# Patient Record
(demographics unavailable — no encounter records)

---

## 2025-04-18 NOTE — HISTORY OF PRESENT ILLNESS
[FreeTextEntry1] : new pt, establish care [de-identified] : Patient is a 75-year-old male with past medical history of HTN, HLD, CAD, PAD, CVA (many years ago), presenting to clinic to establish care. Patient is accompanied by daughter who is translating.  Recently hospitalized for UTI/ COVID, had a fall however his CT head was negative for bleed. Recently immigrated from [place] (September 2024), and had care delivered in [place]. Recently has been forgetful, and increasingly incontinent with his urine. Also unstable gait, though notably he has favored one leg since his CVA. Otherwise does not endorse chest pain, SOB, n/v, weight loss. Endorses cold intolerance.

## 2025-04-18 NOTE — PHYSICAL EXAM
[No Acute Distress] : no acute distress [Well Nourished] : well nourished [Normal Sclera/Conjunctiva] : normal sclera/conjunctiva [PERRL] : pupils equal round and reactive to light [Normal Outer Ear/Nose] : the outer ears and nose were normal in appearance [Normal Oropharynx] : the oropharynx was normal [No JVD] : no jugular venous distention [No Lymphadenopathy] : no lymphadenopathy [Supple] : supple [No Respiratory Distress] : no respiratory distress  [No Accessory Muscle Use] : no accessory muscle use [Clear to Auscultation] : lungs were clear to auscultation bilaterally [No Carotid Bruits] : no carotid bruits [No Abdominal Bruit] : a ~M bruit was not heard ~T in the abdomen [Soft] : abdomen soft [Non Tender] : non-tender [No HSM] : no HSM [Normal Bowel Sounds] : normal bowel sounds [No CVA Tenderness] : no CVA  tenderness [No Joint Swelling] : no joint swelling [No Rash] : no rash [de-identified] : Distant heart sounds  [de-identified] : No palpable pulses, 2+ pitting edema to mid-shins [de-identified] : Broad based gait

## 2025-04-18 NOTE — REVIEW OF SYSTEMS
[Constipation] : constipation [Incontinence] : incontinence [Frequency] : frequency [Fever] : no fever [Night Sweats] : no night sweats [Recent Change In Weight] : ~T no recent weight change [Discharge] : no discharge [Vision Problems] : no vision problems [Earache] : no earache [Nasal Discharge] : no nasal discharge [Chest Pain] : no chest pain [Palpitations] : no palpitations [Orthopena] : no orthopnea [Shortness Of Breath] : no shortness of breath [Wheezing] : no wheezing [Abdominal Pain] : no abdominal pain [Nausea] : no nausea [Vomiting] : no vomiting [Dysuria] : no dysuria [Joint Pain] : no joint pain [Back Pain] : no back pain [Headache] : no headache [Memory Loss] : no memory loss [FreeTextEntry1] : Cold intolerance

## 2025-04-18 NOTE — ASSESSMENT
[FreeTextEntry1] : Discussed with Dr. Flores.   Total time of encounter: 90 minutes   RTC in 5 weeks.   [employer] [facility]  [Vaccines Reviewed] : Immunizations reviewed today. Please see immunization details in the vaccine log within the immunization flowsheet.

## 2025-04-18 NOTE — HISTORY OF PRESENT ILLNESS
[FreeTextEntry1] : new pt, establish care [de-identified] : Patient is a 75-year-old male with past medical history of HTN, HLD, CAD, PAD, CVA (many years ago), presenting to clinic to establish care. Patient is accompanied by daughter who is translating.  Recently hospitalized for UTI/ COVID, had a fall however his CT head was negative for bleed. Recently immigrated from [place] (September 2024), and had care delivered in [place]. Recently has been forgetful, and increasingly incontinent with his urine. Also unstable gait, though notably he has favored one leg since his CVA. Otherwise does not endorse chest pain, SOB, n/v, weight loss. Endorses cold intolerance.

## 2025-04-18 NOTE — PHYSICAL EXAM
[No Acute Distress] : no acute distress [Well Nourished] : well nourished [Normal Sclera/Conjunctiva] : normal sclera/conjunctiva [PERRL] : pupils equal round and reactive to light [Normal Outer Ear/Nose] : the outer ears and nose were normal in appearance [Normal Oropharynx] : the oropharynx was normal [No JVD] : no jugular venous distention [No Lymphadenopathy] : no lymphadenopathy [Supple] : supple [No Respiratory Distress] : no respiratory distress  [No Accessory Muscle Use] : no accessory muscle use [Clear to Auscultation] : lungs were clear to auscultation bilaterally [No Carotid Bruits] : no carotid bruits [No Abdominal Bruit] : a ~M bruit was not heard ~T in the abdomen [Soft] : abdomen soft [Non Tender] : non-tender [No HSM] : no HSM [Normal Bowel Sounds] : normal bowel sounds [No CVA Tenderness] : no CVA  tenderness [No Joint Swelling] : no joint swelling [No Rash] : no rash [de-identified] : Distant heart sounds  [de-identified] : No palpable pulses, 2+ pitting edema to mid-shins [de-identified] : Broad based gait

## 2025-04-18 NOTE — END OF VISIT
[] : Resident [FreeTextEntry3] : New pt, here with daughter. Denies PND or orthopnea, does endorse LE swelling, worse at end of day, better in am. Gait is bad, hs not had PT. Recent hospitalization, records reviewed. Pt with calcifications in 3 arterial beds (carotid, LE, and on MV and AV on echo, so likely has severe longstanding atherosclerosis, and would benefit from high intensity statin, unclear if pt requires DAPT. On exam, frail appearing, NAD, difficulty with getting up from seated position, very distant heart sounds, lungs clear, bilat +2 pitting LE edema, absent distal pulses. Agree that some s/s concerning for NPH and would evaluated further with MRI, then can clarify other issues on return visits.

## 2025-05-20 NOTE — HISTORY OF PRESENT ILLNESS
[de-identified] : Patient is a 75-year-old male with past medical history of HTN, HLD, CAD, ?PAD, presenting to clinic to follow up. Patient is accompanied by daughter who was translating.  This visit patient is endorsing feeling well, still endorses cold intolerance, and unsteadiness on his feet (ambulates with a cane) as well as urinary incontinence. Denies feeling short of breath, chest pain, or claudication. Daughter presented echo report, which showed EF of 65%, grade 1 diastolic dysfunction, and moderate aortic and mitral regurgitation. Patient reports feeling well, no claudication, shortness of breath, chest pain, palpitations or wheezing.

## 2025-05-20 NOTE — HISTORY OF PRESENT ILLNESS
[de-identified] : Patient is a 75-year-old male with past medical history of HTN, HLD, CAD, ?PAD, presenting to clinic to follow up. Patient is accompanied by daughter who was translating.  This visit patient is endorsing feeling well, still endorses cold intolerance, and unsteadiness on his feet (ambulates with a cane) as well as urinary incontinence. Denies feeling short of breath, chest pain, or claudication. Daughter presented echo report, which showed EF of 65%, grade 1 diastolic dysfunction, and moderate aortic and mitral regurgitation. Patient reports feeling well, no claudication, shortness of breath, chest pain, palpitations or wheezing.

## 2025-05-20 NOTE — PHYSICAL EXAM
[No Acute Distress] : no acute distress [Well Developed] : well developed [Normal Sclera/Conjunctiva] : normal sclera/conjunctiva [EOMI] : extraocular movements intact [No JVD] : no jugular venous distention [No Lymphadenopathy] : no lymphadenopathy [Supple] : supple [Normal Rate] : normal rate  [Regular Rhythm] : with a regular rhythm [Normal S1, S2] : normal S1 and S2 [Soft] : abdomen soft [Non Tender] : non-tender [No HSM] : no HSM [Normal Bowel Sounds] : normal bowel sounds [de-identified] : 1+ pitting edema of both ankles

## 2025-05-20 NOTE — HISTORY OF PRESENT ILLNESS
[de-identified] : Patient is a 75-year-old male with past medical history of HTN, HLD, CAD, ?PAD, presenting to clinic to follow up. Patient is accompanied by daughter who was translating.  This visit patient is endorsing feeling well, still endorses cold intolerance, and unsteadiness on his feet (ambulates with a cane) as well as urinary incontinence. Denies feeling short of breath, chest pain, or claudication. Daughter presented echo report, which showed EF of 65%, grade 1 diastolic dysfunction, and moderate aortic and mitral regurgitation. Patient reports feeling well, no claudication, shortness of breath, chest pain, palpitations or wheezing.

## 2025-05-20 NOTE — ASSESSMENT
[FreeTextEntry1] :  Discussed with Dr. Franco.   Total time of encounter:90 minutes    RTC in 5 weeks, with nursing visit in 2 weeks for BMP lab draw.    [employer] [facility]

## 2025-05-20 NOTE — PHYSICAL EXAM
[No Acute Distress] : no acute distress [Well Developed] : well developed [Normal Sclera/Conjunctiva] : normal sclera/conjunctiva [EOMI] : extraocular movements intact [No JVD] : no jugular venous distention [No Lymphadenopathy] : no lymphadenopathy [Supple] : supple [Normal Rate] : normal rate  [Regular Rhythm] : with a regular rhythm [Normal S1, S2] : normal S1 and S2 [Soft] : abdomen soft [Non Tender] : non-tender [No HSM] : no HSM [Normal Bowel Sounds] : normal bowel sounds [de-identified] : 1+ pitting edema of both ankles

## 2025-05-25 NOTE — DISCUSSION/SUMMARY
[Hypertension] : hypertension [Low Sodium Diet] : low sodium diet [FreeTextEntry1] : Currently stable from a cardiovascular standpoint. Hypertensive today. Appears slightly volume overloaded. Patient with unclear history of CAD. Recent echo reviewed. Patient with moderate mitral regurgitation and moderate aortic regurgitation with preserved LV systolic function (LVEF 67%). Of note, he has CKD Stage III (creatinine 1.56, eGFR 45). Occasional SOB likely related to volume status. Dizziness and imbalance possibly secondary to normal pressure hydrocephalus. Continue current medications including furosemide 40 mg daily. ECG completed today and reviewed (findings as noted above). Daily weights. Daughter will attempt to obtain some prior medical records from [facility] for review. Follow up in 4-6 weeks. Patient to follow up with neurology. [EKG obtained to assist in diagnosis and management of assessed problem(s)] : EKG obtained to assist in diagnosis and management of assessed problem(s)

## 2025-05-25 NOTE — PHYSICAL EXAM
[Well Developed] : well developed [Well Nourished] : well nourished [No Acute Distress] : no acute distress [Normal Conjunctiva] : normal conjunctiva [Normal Venous Pressure] : normal venous pressure [No Carotid Bruit] : no carotid bruit [Normal S1, S2] : normal S1, S2 [No Murmur] : no murmur [No Rub] : no rub [No Gallop] : no gallop [Clear Lung Fields] : clear lung fields [Good Air Entry] : good air entry [No Respiratory Distress] : no respiratory distress  [Soft] : abdomen soft [Non Tender] : non-tender [Normal Gait] : normal gait [No Cyanosis] : no cyanosis [No Rash] : no rash [No Skin Lesions] : no skin lesions [Moves all extremities] : moves all extremities [No Focal Deficits] : no focal deficits [Normal Speech] : normal speech [Alert and Oriented] : alert and oriented [de-identified] : trace to 1+ piitng left ankle edema

## 2025-05-25 NOTE — CARDIOLOGY SUMMARY
[de-identified] : 05/22/25 - normal sinus rhythm, RBBB, LAFB, nonspecific ST abnormality [de-identified] : 05/09/25 - mod MR, AV gradient (peak 18 mmHg, mean 10 mmHg), mod AR, normal LA, concentric LV remodeling, normal RV size and function, LVEF 65%

## 2025-05-25 NOTE — CARDIOLOGY SUMMARY
[de-identified] : 05/22/25 - normal sinus rhythm, RBBB, LAFB, nonspecific ST abnormality [de-identified] : 05/09/25 - mod MR, AV gradient (peak 18 mmHg, mean 10 mmHg), mod AR, normal LA, concentric LV remodeling, normal RV size and function, LVEF 65%

## 2025-05-25 NOTE — PHYSICAL EXAM
[Well Developed] : well developed [Well Nourished] : well nourished [No Acute Distress] : no acute distress [Normal Conjunctiva] : normal conjunctiva [Normal Venous Pressure] : normal venous pressure [No Carotid Bruit] : no carotid bruit [Normal S1, S2] : normal S1, S2 [No Murmur] : no murmur [No Rub] : no rub [No Gallop] : no gallop [Clear Lung Fields] : clear lung fields [Good Air Entry] : good air entry [No Respiratory Distress] : no respiratory distress  [Soft] : abdomen soft [Non Tender] : non-tender [Normal Gait] : normal gait [No Cyanosis] : no cyanosis [No Rash] : no rash [No Skin Lesions] : no skin lesions [Moves all extremities] : moves all extremities [No Focal Deficits] : no focal deficits [Normal Speech] : normal speech [Alert and Oriented] : alert and oriented [de-identified] : trace to 1+ piitng left ankle edema

## 2025-05-25 NOTE — HISTORY OF PRESENT ILLNESS
[FreeTextEntry1] : Has some dizziness and balance issues. Occasional SOB. Denies chest pain or palpitations. Patient moved from [place] in September. Has an unclear history of CAD.

## 2025-05-25 NOTE — DISCUSSION/SUMMARY
[Hypertension] : hypertension [Low Sodium Diet] : low sodium diet [FreeTextEntry1] : Currently stable from a cardiovascular standpoint. Hypertensive today. Appears slightly volume overloaded. Patient with unclear history of CAD. Recent echo reviewed. Patient with moderate mitral regurgitation and moderate aortic regurgitation with preserved LV systolic function (LVEF 67%). Of note, he has CKD Stage III (creatinine 1.56, eGFR 45). Occasional SOB likely related to volume status. Dizziness and imbalance possibly secondary to normal pressure hydrocephalus. Continue current medications including furosemide 40 mg daily. ECG completed today and reviewed (findings as noted above). Daily weights. Daughter will attempt to obtain some prior medical records from [facility] for review. Follow up in 4-6 weeks. Patient to follow up with neurology. [EKG obtained to assist in diagnosis and management of assessed problem(s)] : EKG obtained to assist in diagnosis and management of assessed problem(s)

## 2025-05-27 NOTE — HISTORY OF PRESENT ILLNESS
[FreeTextEntry1] : 75-year-old gentleman with a history of CVA will with vision left hemiparesis and gradual decline in memory along with urinary incontinence.  Patient was dizzy for a few days and went to [facility] on March 26 where head CT showed possible NPH.  Patient had a MRI on April 30 which showed atrophy or NPH.  Patient's memory loss includes forgetting whether or not he had lunch.  Patient had the symptoms for couple years.

## 2025-05-27 NOTE — DISCUSSION/SUMMARY
[FreeTextEntry1] : 75-year-old gentleman with a history of CVA with possible vascular dementia versus NPH is here for initial consultation.  I think his MRI shows hydrocephalus ex vacuo.  Patient has a diagnosis of NPH and therefore we will check coags, large-volume tap under fluoroscopy and physical therapy evaluation before and after the tap to evaluate for possible NPH.  Patient will follow-up with me after the studies are completed.  I spent the time noted on the day of this patient encounter preparing for, review of medical records,review of pertinent diagnostic studies, providing and documenting the above E/M service and counseling and educate patient on differential, workup, disease course, and treatment/management. Education was provided to the patient during this encounter. All questions and concerns were answered and addressed in detail. The patient verbalized understanding and agreed to plan. Patient was advised to continue to monitor for neurologic symptoms and to notify my office or go to the nearest emergency room if there are any changes. Any orders/referrals and communications were provided as well. Side effects of the above medications were discussed in detail including but not limited to applicable black box warning and teratogenicity as appropriate. Patient was advised to bring previous records to my office. A copy of the consult note will be sent to the referring physician.

## 2025-05-27 NOTE — PHYSICAL EXAM
[General Appearance - Alert] : alert [Oriented To Time, Place, And Person] : oriented to person, place, and time [Short Term Intact] : short term memory impaired [Fluency] : fluency intact [Current Events] : adequate knowledge of current events [Cranial Nerves Optic (II)] : visual acuity intact bilaterally,  visual fields full to confrontation, pupils equal round and reactive to light [Cranial Nerves Oculomotor (III)] : extraocular motion intact [Cranial Nerves Facial (VII)] : face symmetrical [Cranial Nerves Vestibulocochlear (VIII)] : hearing was intact bilaterally [Cranial Nerves Accessory (XI - Cranial And Spinal)] : head turning and shoulder shrug symmetric [Cranial Nerves Hypoglossal (XII)] : there was no tongue deviation with protrusion [Motor Tone] : muscle tone was normal in all four extremities [Motor Strength] : muscle strength was normal in all four extremities [Sensation Tactile Decrease] : light touch was intact [Coordination - Dysmetria Impaired Finger-to-Nose Bilateral] : not present [1+] : Patella right 1+ [2+] : Patella left 2+ [FreeTextEntry8] : antalgic walk with cane

## 2025-07-18 NOTE — HISTORY OF PRESENT ILLNESS
[FreeTextEntry1] : fu for feet swelling  [de-identified] : 75-year-old male with past medical history of HTN, HFpEF, HLD, CAD, ?PAD presenting for fu   This visit patient is endorsing feeling well, still endorses cold intolerance, and unsteadiness on his feet (ambulates with a cane) as well as urinary incontinence. Denies feeling short of breath, chest pain, or claudication. Daughter presented echo report, which showed EF of 65%, grade 1 diastolic dysfunction, and moderate aortic and mitral regurgitation. Patient reports feeling well, no claudication, shortness of breath, chest pain, palpitations or wheezing.

## 2025-07-25 NOTE — HISTORY OF PRESENT ILLNESS
[FreeTextEntry1] : follow up leg swelling [de-identified] : This is a 75-year-old male with past medical history of HTN, HLD, CAD, PAD?, CKD, CVA presenting for follow up.  Patient continues to have bilateral lower extremity swelling without tenderness.  The swelling has had made ambulating more difficult.  He had a recent echo which showed mod MR, AV gradient (peak 18 mmHg, mean 10 mmHg), mod AR, normal LA, concentric LV remodeling, normal RV size and function, LVEF 65%.  Per cardiology patient to be on 40 of Lasix.  However patient has only been taking Lasix 1-2 times per week due to increased frequent urination.  On the Lasix will go every hour, off Lasix he is still going every 2-3 hours.  Due to his unsteadiness getting to the bathroom is more difficult.  He has not had associated orthopnea, PND, SOB, TSAI, CP.  Patient has also been managed by neurology for unsteadiness.  He had presumed NPH, also was considered for vascular dementia.  Patient had a therapeutic LP with 30 cc CSF removed.  Since then patient has not had any improvement in his ambulation or gait.  Patient reportedly had 2 falls over past few weeks without head injury, LOC, other injury.  Patient continues to ambulate with a cane.  Patient also reportedly had a left medial eye stye which popped and had brief bleeding which is since resolved.  Patient has not had any worsening swelling around the eye, eye pain, vision changes, additional bleeding.

## 2025-07-25 NOTE — PHYSICAL EXAM
[No Acute Distress] : no acute distress [Well Nourished] : well nourished [Well Developed] : well developed [EOMI] : extraocular movements intact [No Respiratory Distress] : no respiratory distress  [No Accessory Muscle Use] : no accessory muscle use [Clear to Auscultation] : lungs were clear to auscultation bilaterally [Normal Rate] : normal rate  [Regular Rhythm] : with a regular rhythm [Normal S1, S2] : normal S1 and S2 [Soft] : abdomen soft [Non Tender] : non-tender [Non-distended] : non-distended [No Rash] : no rash [Normal] : no rash [Normal Affect] : the affect was normal [Alert and Oriented x3] : oriented to person, place, and time [de-identified] : 1+ RLE pitting edema, 2+ LLE pitting edema, no calf tenderness [de-identified] : walks with walker

## 2025-07-25 NOTE — END OF VISIT
[] : Resident [FreeTextEntry3] : Pt here with his daughter who translates. Legs do not ache, but are swollen, L>R, not particularly worse at any time of the day. Pt spend most of the day sitting with his legs down, sometimes naps flat on the bed. On exam, NAD, +2 R/+1 L ankle and calf edema, with classis pigmentary changes of CVI, dilate veins around ankles bilat, and a few varicosities of distal thighs. No warmth or erythema. Most c/w CVI recommend elevation, pumping of calves and compression hose, would avoid diuretics in setting of dizziness and gait issues. Can reassess. Echo with nl function and no evidence otherwise of volume overload.

## 2025-07-25 NOTE — REVIEW OF SYSTEMS
[Lower Ext Edema] : lower extremity edema [Unsteady Walk] : ataxia [Negative] : Integumentary [Fever] : no fever [Chills] : no chills [Recent Change In Weight] : ~T no recent weight change [Discharge] : no discharge [Pain] : no pain [Vision Problems] : no vision problems [Chest Pain] : no chest pain [Palpitations] : no palpitations [Claudication] : no  leg claudication [Orthopena] : no orthopnea [Paroxysmal Nocturnal Dyspnea] : no paroxysmal nocturnal dyspnea [Shortness Of Breath] : no shortness of breath [Wheezing] : no wheezing [Cough] : no cough [Dyspnea on Exertion] : not dyspnea on exertion [Abdominal Pain] : no abdominal pain [Nausea] : no nausea [Vomiting] : no vomiting [Dysuria] : no dysuria [Hematuria] : no hematuria [Joint Pain] : no joint pain [Back Pain] : no back pain [Itching] : no itching [Skin Rash] : no skin rash [Headache] : no headache [Dizziness] : no dizziness [Fainting] : no fainting [de-identified] : falls